# Patient Record
Sex: FEMALE | Race: BLACK OR AFRICAN AMERICAN | Employment: UNEMPLOYED | URBAN - METROPOLITAN AREA
[De-identification: names, ages, dates, MRNs, and addresses within clinical notes are randomized per-mention and may not be internally consistent; named-entity substitution may affect disease eponyms.]

---

## 2017-06-14 ENCOUNTER — HOSPITAL ENCOUNTER (EMERGENCY)
Age: 42
Discharge: HOME | End: 2017-06-14
Attending: EMERGENCY MEDICINE
Payer: MEDICAID

## 2017-06-14 VITALS
OXYGEN SATURATION: 98 % | HEIGHT: 66 IN | SYSTOLIC BLOOD PRESSURE: 132 MMHG | TEMPERATURE: 98.7 F | DIASTOLIC BLOOD PRESSURE: 83 MMHG | WEIGHT: 198 LBS | BODY MASS INDEX: 31.82 KG/M2 | RESPIRATION RATE: 14 BRPM | HEART RATE: 96 BPM

## 2017-06-14 DIAGNOSIS — R21 RASH AND OTHER NONSPECIFIC SKIN ERUPTION: ICD-10-CM

## 2017-06-14 DIAGNOSIS — R19.7 DIARRHEA, UNSPECIFIED TYPE: Primary | ICD-10-CM

## 2017-06-14 LAB
ALBUMIN SERPL BCP-MCNC: 3.7 G/DL (ref 3.4–5)
ALBUMIN/GLOB SERPL: 1.1 {RATIO} (ref 0.8–1.7)
ALP SERPL-CCNC: 62 U/L (ref 45–117)
ALT SERPL-CCNC: 24 U/L (ref 13–56)
ANION GAP BLD CALC-SCNC: 14 MMOL/L (ref 3–18)
APPEARANCE UR: CLEAR
AST SERPL W P-5'-P-CCNC: 23 U/L (ref 15–37)
BASOPHILS # BLD AUTO: 0 K/UL (ref 0–0.06)
BASOPHILS # BLD: 1 % (ref 0–2)
BILIRUB DIRECT SERPL-MCNC: 0.2 MG/DL (ref 0–0.2)
BILIRUB SERPL-MCNC: 0.6 MG/DL (ref 0.2–1)
BILIRUB UR QL: NEGATIVE
BUN SERPL-MCNC: 14 MG/DL (ref 7–18)
BUN/CREAT SERPL: 15 (ref 12–20)
CALCIUM SERPL-MCNC: 9 MG/DL (ref 8.5–10.1)
CHLORIDE SERPL-SCNC: 106 MMOL/L (ref 100–108)
CO2 SERPL-SCNC: 20 MMOL/L (ref 21–32)
COLOR UR: YELLOW
CREAT SERPL-MCNC: 0.93 MG/DL (ref 0.6–1.3)
DIFFERENTIAL METHOD BLD: ABNORMAL
EOSINOPHIL # BLD: 0.1 K/UL (ref 0–0.4)
EOSINOPHIL NFR BLD: 1 % (ref 0–5)
ERYTHROCYTE [DISTWIDTH] IN BLOOD BY AUTOMATED COUNT: 14.1 % (ref 11.6–14.5)
GLOBULIN SER CALC-MCNC: 3.4 G/DL (ref 2–4)
GLUCOSE SERPL-MCNC: 104 MG/DL (ref 74–99)
GLUCOSE UR STRIP.AUTO-MCNC: NEGATIVE MG/DL
HCG UR QL: NEGATIVE
HCT VFR BLD AUTO: 36.5 % (ref 35–45)
HGB BLD-MCNC: 12.1 G/DL (ref 12–16)
HGB UR QL STRIP: NEGATIVE
KETONES UR QL STRIP.AUTO: NEGATIVE MG/DL
LEUKOCYTE ESTERASE UR QL STRIP.AUTO: NEGATIVE
LIPASE SERPL-CCNC: 130 U/L (ref 73–393)
LYMPHOCYTES # BLD AUTO: 26 % (ref 21–52)
LYMPHOCYTES # BLD: 1.6 K/UL (ref 0.9–3.6)
MCH RBC QN AUTO: 29.4 PG (ref 24–34)
MCHC RBC AUTO-ENTMCNC: 33.2 G/DL (ref 31–37)
MCV RBC AUTO: 88.6 FL (ref 74–97)
MONOCYTES # BLD: 0.2 K/UL (ref 0.05–1.2)
MONOCYTES NFR BLD AUTO: 4 % (ref 3–10)
NEUTS SEG # BLD: 4 K/UL (ref 1.8–8)
NEUTS SEG NFR BLD AUTO: 68 % (ref 40–73)
NITRITE UR QL STRIP.AUTO: NEGATIVE
PH UR STRIP: 8 [PH] (ref 5–8)
PLATELET # BLD AUTO: 334 K/UL (ref 135–420)
PMV BLD AUTO: 10.8 FL (ref 9.2–11.8)
POTASSIUM SERPL-SCNC: 3.9 MMOL/L (ref 3.5–5.5)
PROT SERPL-MCNC: 7.1 G/DL (ref 6.4–8.2)
PROT UR STRIP-MCNC: NEGATIVE MG/DL
RBC # BLD AUTO: 4.12 M/UL (ref 4.2–5.3)
SODIUM SERPL-SCNC: 140 MMOL/L (ref 136–145)
SP GR UR REFRACTOMETRY: 1.01 (ref 1–1.03)
UROBILINOGEN UR QL STRIP.AUTO: 0.2 EU/DL (ref 0.2–1)
WBC # BLD AUTO: 5.9 K/UL (ref 4.6–13.2)

## 2017-06-14 PROCEDURE — 83690 ASSAY OF LIPASE: CPT | Performed by: EMERGENCY MEDICINE

## 2017-06-14 PROCEDURE — 74011250637 HC RX REV CODE- 250/637: Performed by: PHYSICIAN ASSISTANT

## 2017-06-14 PROCEDURE — 74011250636 HC RX REV CODE- 250/636: Performed by: PHYSICIAN ASSISTANT

## 2017-06-14 PROCEDURE — 80076 HEPATIC FUNCTION PANEL: CPT | Performed by: EMERGENCY MEDICINE

## 2017-06-14 PROCEDURE — 80048 BASIC METABOLIC PNL TOTAL CA: CPT | Performed by: EMERGENCY MEDICINE

## 2017-06-14 PROCEDURE — 81003 URINALYSIS AUTO W/O SCOPE: CPT | Performed by: EMERGENCY MEDICINE

## 2017-06-14 PROCEDURE — 85025 COMPLETE CBC W/AUTO DIFF WBC: CPT | Performed by: EMERGENCY MEDICINE

## 2017-06-14 PROCEDURE — 96360 HYDRATION IV INFUSION INIT: CPT

## 2017-06-14 PROCEDURE — 81025 URINE PREGNANCY TEST: CPT | Performed by: EMERGENCY MEDICINE

## 2017-06-14 PROCEDURE — 99283 EMERGENCY DEPT VISIT LOW MDM: CPT

## 2017-06-14 RX ORDER — MAG HYDROX/ALUMINUM HYD/SIMETH 200-200-20
30 SUSPENSION, ORAL (FINAL DOSE FORM) ORAL
Status: DISCONTINUED | OUTPATIENT
Start: 2017-06-14 | End: 2017-06-14 | Stop reason: HOSPADM

## 2017-06-14 RX ADMIN — SODIUM CHLORIDE 1000 ML: 900 INJECTION, SOLUTION INTRAVENOUS at 12:54

## 2017-06-14 RX ADMIN — ALUMINUM HYDROXIDE, MAGNESIUM HYDROXIDE, AND SIMETHICONE 30 ML: 200; 200; 20 SUSPENSION ORAL at 14:58

## 2017-06-14 NOTE — ED PROVIDER NOTES
Patient is a 46 y.o. female presenting with diarrhea, rash, fatigue, and general illness. The history is provided by the patient. No  was used. Diarrhea    Associated symptoms include diarrhea. Pertinent negatives include no fever, no constipation, no dysuria, no hematuria, no headaches, no arthralgias, no chest pain and no back pain. Rash      Fatigue   Pertinent negatives include no shortness of breath, no chest pain and no headaches. Generalized Body Aches   Associated symptoms include abdominal pain. Pertinent negatives include no chest pain, no headaches and no shortness of breath. Inessa Levy is a 46 y.o. female who presents with generalized body ache, diarrhea and rash on abdomen x 3 days. Pt was recently treated at urgent care for R ear infection and given ceftin but the next day she started having diarrhea and was advised to stop Ceftin and instead was prescribed Flagyl which she took for a day and noticed \"rash on my abdomen\" and stopped taking all her abx. Per patient she had taken flagyl in the past without any side effects. Other than the above mentioned condition patient denies of any SOB , cough, sore throat, fever, abdomen pain, urinary sx or vomiting. Past Medical History:   Diagnosis Date    Costochondritis     Gastrointestinal disorder GERD    GERD (gastroesophageal reflux disease)        Past Surgical History:   Procedure Laterality Date    HX ENDOSCOPY      HX ORTHOPAEDIC      R great toe         No family history on file. Social History     Social History    Marital status: SINGLE     Spouse name: N/A    Number of children: N/A    Years of education: N/A     Occupational History    Not on file.      Social History Main Topics    Smoking status: Never Smoker    Smokeless tobacco: Not on file    Alcohol use No    Drug use: No    Sexual activity: Yes     Birth control/ protection: None     Other Topics Concern    Not on file     Social History Narrative         ALLERGIES: Penicillin g    Review of Systems   Constitutional: Positive for fatigue. Negative for chills, diaphoresis and fever. HENT: Positive for ear pain. Negative for drooling, ear discharge, facial swelling, hearing loss and mouth sores. Eyes: Negative for photophobia, pain, redness and itching. Respiratory: Negative for cough, chest tightness, shortness of breath and wheezing. Cardiovascular: Negative for chest pain, palpitations and leg swelling. Gastrointestinal: Positive for abdominal pain and diarrhea. Negative for abdominal distention, anal bleeding, blood in stool and constipation. Genitourinary: Negative for dysuria, flank pain, hematuria and pelvic pain. Musculoskeletal: Negative for arthralgias, back pain, gait problem and joint swelling. Skin: Positive for rash. Neurological: Negative for light-headedness and headaches. Vitals:    06/14/17 1151   BP: 132/83   Pulse: 96   Resp: 14   Temp: 98.7 °F (37.1 °C)   SpO2: 98%   Weight: 89.8 kg (198 lb)   Height: 5' 6\" (1.676 m)            Physical Exam   Constitutional: She is oriented to person, place, and time. She appears well-developed and well-nourished. HENT:   Head: Normocephalic and atraumatic. Right Ear: External ear normal.   Left Ear: External ear normal.   Nose: Nose normal.   Mouth/Throat: Oropharynx is clear and moist.   Eyes: Conjunctivae and EOM are normal. Pupils are equal, round, and reactive to light. Neck: Normal range of motion. Neck supple. Cardiovascular: Normal rate and regular rhythm. Pulmonary/Chest: Effort normal and breath sounds normal. No respiratory distress. She has no wheezes. She has no rales. Abdominal: Soft. Bowel sounds are normal. She exhibits no distension. There is no tenderness. There is no rebound and no guarding. Neurological: She is alert and oriented to person, place, and time. Skin: Skin is warm. Rash noted.              MDM  Number of Diagnoses or Management Options  Diarrhea, unspecified type:   Rash and other nonspecific skin eruption:   Diagnosis management comments: Rash on abdomen likely appears to be secondary recent abx use. WBC wnl and recommended to stop taking flagyl. Do not think patient has C diff, diarrhea likely also side effect of recent ABX use. Will discharge and recommend follow up with primary care provider. ED Course       Procedures    Recent Results (from the past 12 hour(s))   URINALYSIS W/ RFLX MICROSCOPIC    Collection Time: 06/14/17 12:30 PM   Result Value Ref Range    Color YELLOW      Appearance CLEAR      Specific gravity 1.011 1.005 - 1.030      pH (UA) 8.0 5.0 - 8.0      Protein NEGATIVE  NEG mg/dL    Glucose NEGATIVE  NEG mg/dL    Ketone NEGATIVE  NEG mg/dL    Bilirubin NEGATIVE  NEG      Blood NEGATIVE  NEG      Urobilinogen 0.2 0.2 - 1.0 EU/dL    Nitrites NEGATIVE  NEG      Leukocyte Esterase NEGATIVE  NEG     HCG URINE, QL    Collection Time: 06/14/17 12:30 PM   Result Value Ref Range    HCG urine, Ql. NEGATIVE  NEG     CBC WITH AUTOMATED DIFF    Collection Time: 06/14/17 12:45 PM   Result Value Ref Range    WBC 5.9 4.6 - 13.2 K/uL    RBC 4.12 (L) 4.20 - 5.30 M/uL    HGB 12.1 12.0 - 16.0 g/dL    HCT 36.5 35.0 - 45.0 %    MCV 88.6 74.0 - 97.0 FL    MCH 29.4 24.0 - 34.0 PG    MCHC 33.2 31.0 - 37.0 g/dL    RDW 14.1 11.6 - 14.5 %    PLATELET 490 095 - 297 K/uL    MPV 10.8 9.2 - 11.8 FL    NEUTROPHILS 68 40 - 73 %    LYMPHOCYTES 26 21 - 52 %    MONOCYTES 4 3 - 10 %    EOSINOPHILS 1 0 - 5 %    BASOPHILS 1 0 - 2 %    ABS. NEUTROPHILS 4.0 1.8 - 8.0 K/UL    ABS. LYMPHOCYTES 1.6 0.9 - 3.6 K/UL    ABS. MONOCYTES 0.2 0.05 - 1.2 K/UL    ABS. EOSINOPHILS 0.1 0.0 - 0.4 K/UL    ABS.  BASOPHILS 0.0 0.0 - 0.06 K/UL    DF AUTOMATED     METABOLIC PANEL, BASIC    Collection Time: 06/14/17 12:45 PM   Result Value Ref Range    Sodium 140 136 - 145 mmol/L    Potassium 3.9 3.5 - 5.5 mmol/L    Chloride 106 100 - 108 mmol/L    CO2 20 (L) 21 - 32 mmol/L    Anion gap 14 3.0 - 18 mmol/L    Glucose 104 (H) 74 - 99 mg/dL    BUN 14 7.0 - 18 MG/DL    Creatinine 0.93 0.6 - 1.3 MG/DL    BUN/Creatinine ratio 15 12 - 20      GFR est AA >60 >60 ml/min/1.73m2    GFR est non-AA >60 >60 ml/min/1.73m2    Calcium 9.0 8.5 - 10.1 MG/DL   LIPASE    Collection Time: 06/14/17 12:45 PM   Result Value Ref Range    Lipase 130 73 - 393 U/L   HEPATIC FUNCTION PANEL    Collection Time: 06/14/17 12:45 PM   Result Value Ref Range    Protein, total 7.1 6.4 - 8.2 g/dL    Albumin 3.7 3.4 - 5.0 g/dL    Globulin 3.4 2.0 - 4.0 g/dL    A-G Ratio 1.1 0.8 - 1.7      Bilirubin, total 0.6 0.2 - 1.0 MG/DL    Bilirubin, direct 0.2 0.0 - 0.2 MG/DL    Alk. phosphatase 62 45 - 117 U/L    AST (SGOT) 23 15 - 37 U/L    ALT (SGPT) 24 13 - 56 U/L         DISCHARGE NOTE:  2:54 PM    Anna Luevano's  results have been reviewed with her. She has been counseled regarding her diagnosis, treatment, and plan. She verbally conveys understanding and agreement of the signs, symptoms, diagnosis, treatment and prognosis and additionally agrees to follow up as discussed. She also agrees with the care-plan and conveys that all of her questions have been answered. I have also provided discharge instructions for her that include: educational information regarding their diagnosis and treatment, and list of reasons why they would want to return to the ED prior to their follow-up appointment, should her condition change. CLINICAL IMPRESSION:    1. Diarrhea, unspecified type    2. Rash and other nonspecific skin eruption        AFTER VISIT PLAN:    Current Discharge Medication List           Follow-up Information     Follow up With Details Comments Luis [address]   [facility] [address]  [address] [facility] EMERGENCY DEPT  If symptoms worsen [address]  [phone]           Written by Tanvi FELDER I was personally available for consultation in the emergency department. I have reviewed the chart prior to the patient being discharged and agree with the documentation recorded by the [facility] AND [facility], including the assessment, treatment plan, and disposition.   Mac Adames MD

## 2017-06-14 NOTE — ED TRIAGE NOTES
Patient states she was seen by urgent care last week for an ear infection, given an antibiotic (Toni Farrell), diarrhea started after taking the antibiotic, patient was given a different medication yesterday and woke with a rash today      Patient was givne medication for C diff (stool sample was taken yesterday at Urgent care on [address] [facility]).     Patient states she has been feeling fatigued

## 2017-06-14 NOTE — ED NOTES
Peg Hemp  [address] [facility] EMERGENCY DEPT      46 y.o. female with noted past medical history who presents to the emergency department with multiple complaints. Initially seen last week at urgent care for ear infx, prescribed ceftin, and then got diarrhea. Went back to urgent care and told to d/c ceftin and that they were worried she has C. Diff. Now has rash after rxed a new medication and feeling fatigued. I performed a brief evaluation, including history and physical, of the patient here in triage and I have determined that pt will need further treatment and evaluation from the main side ER physician. I have placed initial orders to help in expediting patients care. Ramona Campos M.D.

## 2018-04-17 ENCOUNTER — HOSPITAL ENCOUNTER (OUTPATIENT)
Dept: LAB | Age: 43
Discharge: HOME | End: 2018-04-17
Payer: MEDICAID

## 2018-04-17 PROCEDURE — 88175 CYTOPATH C/V AUTO FLUID REDO: CPT | Performed by: OBSTETRICS & GYNECOLOGY

## 2022-05-24 ENCOUNTER — HOSPITAL ENCOUNTER (OUTPATIENT)
Dept: NUTRITION | Age: 47
Discharge: HOME | End: 2022-05-24
Payer: MEDICAID

## 2022-05-24 PROCEDURE — 97802 MEDICAL NUTRITION INDIV IN: CPT

## 2022-05-24 NOTE — PROGRESS NOTES
..  [facility] - Outpatient Nutrition Services  [address] [facility], [place]   Nutrition Assessment - Medical Nutrition Therapy   Outpatient Initial Evaluation         Patient Name: Chidi Hastings : 1975   Treatment Diagnosis: Type 2 DM (diagnosed in January)   Referral Source: Archana Cha MD Start of Care [facility]): 2022     Gender: female Age: 42 y.o. Ht: 66 in Wt: 221  lb  kg   BMI: 35.51 BMR   Male  BMR Female [id number]     Past Medical History:  Anxiety, GERD, vit D deficiency     Pertinent Medications:   Metformin 500 mg BID, MD had ordered 12 units of Lantus to be taken in AM (Patient did not mention that she is taking insulin)  No statin medication     Biochemical Data:   According to MD notes from 2022 patient's A1C is 12%. The patient said her A1C was 6.7%. Unable to view labs. Assessment:   The patient is here for diabetes education. She says she was diagnosed in January and was not provided with any information besides being directed to take medication. She desires to lose weight. She would like to not have to take any medications for diabetes. She does have a glucometer and is supposed to check her blood sugar at fasting and 1 hour after dinner. She did not know what her target blood sugars should be. She says she was moved to the diabetes center at [facility] for care but she does not know the name of her endocrinologist. She says she does not exercise and currently eats a lot of fast food. She knows she needs to stop eating the fast food. She is not employed currently.        Food & Nutrition: Breakfast (8:30-9): sausage, egg and cheese croissant (Tonye Lesa), no coffee, no fruit juice  Lunch (12:30-1): SKIPS or [employer] crispy chicken sandwich with wise lettuce and tomato and fries  Dinner (6:30-7): plate of spaghetti and broccoli OR steak, baked potato, broccoli, little cup of peach juice (If at her sister's house)  She does not eat a lot of chicken or fish. She does eat mac and cheese, rice and pasta for her starches. Beverages: gave up the sodas (will drink coke zero sometimes), still drinking peach juice, 2 water bottles per day (4 cups)  Problem areas: no portion control, was drinking a lot of sugary drinks, eats fast food and processed foods, no exercise, not drinking enough water, not enough vegetables, skipping lunch sometimes       Estimate Needs   Calories: 1600  Protein: 100 Carbs: 180 Fat: 53   Kcal/day  g/day  g/day  g/day        percent: 25  45  30               Nutrition Diagnosis   Obesity related to excessive energy intake as evidenced by BMI of 35.51    Nutrition knowledge deficit related to new dx of DM as evidenced by overconsumption of CHO containing foods, fast food, lack of exercise, inappropriate diet to prevent weight gain     Altered nutrition related lab values related to poorly controlled type 2 DM as evidenced by A1C 12%           Nutrition Intervention &  Education: Educated patient on diabetes, weight loss diet with plate method guidelines. Discussed the pathophysiology of diabetesl: what is it, appropriate ranges for blood sugars and when to check it, what is an X7O, complications if not managed. Encouraged the patient to eat at least 3 times per day, spacing meals no more than 4-5 hours apart (2-3 hours in-between snacks). Discussed that 1/2 the plate should include non-starchy vegetables, 1/4 plate should be lean protein, and 1/4 of plate should be carbohydrate. Provided the patient with list of macro-nutrient sources (CHO, pro, and fat) and appropriate amounts of CHO to be consumed at each meal and snack. Encouraged the patient to try using measuring cups or JoinMe@ iveth to familiarize with appropriate serving sizes. Suggested the patient include protein source with all meals and snacks. Include more non-starchy vegetables and 2 fruit servings per day (eat a variety).  Don't drink calories: avoid fruit juice, regular sodas, sweet tea, Gatorade. Eliminate/decrease fast food consumption. Taught patient how to read a food label. We discussed the importance of timing of meals. Discussed importance of 150 minutes per week physical activity. Handouts Provided: [x]  Carbohydrates  [x]  Protein  [x]  Non-starchy Vegetables  [x]  Food Label  [x]  Meal and Snack Ideas  []  Food Journals [x]  Diabetes  []  Cholesterol  []  Sodium  [x]  Gen Nutr Guidelines  [x]  SBGM Guidelines  [x]  Others: My Healthy Plate   Information Reviewed with: Patient   Readiness to Change Stage: []  Pre-contemplative    []  Contemplative  [x]  Preparation               []  Action                  []  Maintenance   Potential Barriers to Learning: []  Decline in memory    []  Language barrier   []  Other:  []  Emotional                  []  Limited mobility  []  Lack of motivation     [] Vision, hearing or cognitive impairment   Expected Compliance: Fair to good. The patient has already made a few changes. She recognizes the importance of taking care of herself. She was very receptive to the information presented today. Nutritional Goal - To promote lifestyle changes to result in:    [x]  Weight loss  [x]  Improved diabetic control  []  Decreased cholesterol levels  []  Decreased blood pressure  []  Weight maintenance []  Preventing any interactions associated with food allergies  []  Adequate weight gain toward goal weight  []  Other:        Patient Goals:   1. Eat 3 balanced meals per day following plate method guidelines (controlled portions of carbs: 30-45 g per meal, lean protein and non-starchy vegetables or salad)  2. Give up the fast food. If you are going to eat it, make better choices (grilled chicken, get a salad, don't super size etc)  3. Space meals every 4-5 hours  4. No sugary drinks. Consume 64 oz water daily. 5. Start exercising and be consistent. Try walking after meals and move throughout the day.  Goal is 30 minutes of cardio at least 5 days per week.     Scott Garay RD, [facility]  Follow-up: Pt did not make a follow up appointment today. She may consider coming back to see me at [facility], [facility].. Pt has RD contact info for questions and concerns.  Time: 3:37 PM